# Patient Record
Sex: MALE
[De-identification: names, ages, dates, MRNs, and addresses within clinical notes are randomized per-mention and may not be internally consistent; named-entity substitution may affect disease eponyms.]

---

## 2024-05-22 ENCOUNTER — DASHBOARD ENCOUNTERS (OUTPATIENT)
Age: 19
End: 2024-05-22

## 2024-05-22 ENCOUNTER — OFFICE VISIT (OUTPATIENT)
Dept: URBAN - METROPOLITAN AREA CLINIC 128 | Facility: CLINIC | Age: 19
End: 2024-05-22
Payer: COMMERCIAL

## 2024-05-22 ENCOUNTER — TELEPHONE ENCOUNTER (OUTPATIENT)
Dept: URBAN - METROPOLITAN AREA CLINIC 128 | Facility: CLINIC | Age: 19
End: 2024-05-22

## 2024-05-22 VITALS
BODY MASS INDEX: 22.89 KG/M2 | SYSTOLIC BLOOD PRESSURE: 120 MMHG | HEIGHT: 72 IN | DIASTOLIC BLOOD PRESSURE: 72 MMHG | TEMPERATURE: 98.1 F | WEIGHT: 169 LBS

## 2024-05-22 DIAGNOSIS — R11.2 NAUSEA AND VOMITING IN ADULT: ICD-10-CM

## 2024-05-22 DIAGNOSIS — K21.9 CHRONIC GERD: ICD-10-CM

## 2024-05-22 DIAGNOSIS — R10.2 SUPRAPUBIC ABDOMINAL PAIN: ICD-10-CM

## 2024-05-22 DIAGNOSIS — R19.7 ACUTE DIARRHEA: ICD-10-CM

## 2024-05-22 PROBLEM — 235595009: Status: ACTIVE | Noted: 2024-05-22

## 2024-05-22 PROCEDURE — 99244 OFF/OP CNSLTJ NEW/EST MOD 40: CPT | Performed by: INTERNAL MEDICINE

## 2024-05-22 PROCEDURE — 99204 OFFICE O/P NEW MOD 45 MIN: CPT | Performed by: INTERNAL MEDICINE

## 2024-05-22 RX ORDER — SOD SULF/POT CHLORIDE/MAG SULF 1.479 G
12 TABLETS THE FIRST DOSE THE EVENING BEFORE AND SECOND DOSE THE MORNING OF COLONOSCOPY TABLET ORAL TWICE A DAY
Qty: 24
Start: 2024-05-22 | End: 2024-05-25

## 2024-05-22 RX ORDER — PANTOPRAZOLE SODIUM 40 MG/1
1 TABLET TABLET, DELAYED RELEASE ORAL ONCE A DAY
Qty: 90 TABLET | Refills: 3 | OUTPATIENT
Start: 2024-05-22

## 2024-05-22 RX ORDER — SOD SULF/POT CHLORIDE/MAG SULF 1.479 G
12 TABLETS THE FIRST DOSE THE EVENING BEFORE AND SECOND DOSE THE MORNING OF COLONOSCOPY TABLET ORAL TWICE A DAY
Qty: 24 | OUTPATIENT
Start: 2024-05-22 | End: 2024-05-23

## 2024-06-13 ENCOUNTER — CLAIMS CREATED FROM THE CLAIM WINDOW (OUTPATIENT)
Dept: URBAN - METROPOLITAN AREA CLINIC 4 | Facility: CLINIC | Age: 19
End: 2024-06-13
Payer: COMMERCIAL

## 2024-06-13 ENCOUNTER — OUT OF OFFICE VISIT (OUTPATIENT)
Dept: URBAN - METROPOLITAN AREA SURGERY CENTER 31 | Facility: SURGERY CENTER | Age: 19
End: 2024-06-13
Payer: COMMERCIAL

## 2024-06-13 DIAGNOSIS — D12.5 BENIGN NEOPLASM OF SIGMOID COLON: ICD-10-CM

## 2024-06-13 DIAGNOSIS — R19.7 ACUTE DIARRHEA: ICD-10-CM

## 2024-06-13 DIAGNOSIS — R19.5 OCCULT BLOOD + STOOL: ICD-10-CM

## 2024-06-13 DIAGNOSIS — K21.9 ACID REFLUX: ICD-10-CM

## 2024-06-13 DIAGNOSIS — Z12.11 COLON CANCER SCREENING (HIGH RISK): ICD-10-CM

## 2024-06-13 DIAGNOSIS — K31.89 OTHER DISEASES OF STOMACH AND DUODENUM: ICD-10-CM

## 2024-06-13 DIAGNOSIS — K63.89 OTHER SPECIFIED DISEASES OF INTESTINE: ICD-10-CM

## 2024-06-13 DIAGNOSIS — R10.84 ABDOMINAL CRAMPING, GENERALIZED: ICD-10-CM

## 2024-06-13 DIAGNOSIS — K21.9 GASTRO-ESOPHAGEAL REFLUX DISEASE WITHOUT ESOPHAGITIS: ICD-10-CM

## 2024-06-13 DIAGNOSIS — K63.5 BENIGN COLON POLYP: ICD-10-CM

## 2024-06-13 PROCEDURE — 45385 COLONOSCOPY W/LESION REMOVAL: CPT | Performed by: INTERNAL MEDICINE

## 2024-06-13 PROCEDURE — 88305 TISSUE EXAM BY PATHOLOGIST: CPT | Performed by: PATHOLOGY

## 2024-06-13 PROCEDURE — 43239 EGD BIOPSY SINGLE/MULTIPLE: CPT | Performed by: INTERNAL MEDICINE

## 2024-06-13 PROCEDURE — 88312 SPECIAL STAINS GROUP 1: CPT | Performed by: PATHOLOGY

## 2024-06-13 PROCEDURE — 45380 COLONOSCOPY AND BIOPSY: CPT | Performed by: INTERNAL MEDICINE

## 2024-06-13 PROCEDURE — 00813 ANES UPR LWR GI NDSC PX: CPT | Performed by: NURSE ANESTHETIST, CERTIFIED REGISTERED

## 2024-06-13 RX ORDER — PANTOPRAZOLE SODIUM 40 MG/1
1 TABLET TABLET, DELAYED RELEASE ORAL ONCE A DAY
Qty: 90 TABLET | Refills: 3 | Status: ACTIVE | COMMUNITY
Start: 2024-05-22

## 2024-06-27 ENCOUNTER — TELEPHONE ENCOUNTER (OUTPATIENT)
Dept: URBAN - METROPOLITAN AREA CLINIC 128 | Facility: CLINIC | Age: 19
End: 2024-06-27

## 2024-07-03 ENCOUNTER — OFFICE VISIT (OUTPATIENT)
Dept: URBAN - METROPOLITAN AREA CLINIC 80 | Facility: CLINIC | Age: 19
End: 2024-07-03
Payer: COMMERCIAL

## 2024-07-03 ENCOUNTER — LAB OUTSIDE AN ENCOUNTER (OUTPATIENT)
Dept: URBAN - METROPOLITAN AREA CLINIC 80 | Facility: CLINIC | Age: 19
End: 2024-07-03

## 2024-07-03 VITALS
HEIGHT: 72 IN | TEMPERATURE: 97.9 F | WEIGHT: 172 LBS | HEART RATE: 55 BPM | SYSTOLIC BLOOD PRESSURE: 134 MMHG | BODY MASS INDEX: 23.3 KG/M2 | DIASTOLIC BLOOD PRESSURE: 69 MMHG

## 2024-07-03 DIAGNOSIS — K52.89 (LYMPHOCYTIC) MICROSCOPIC COLITIS: ICD-10-CM

## 2024-07-03 DIAGNOSIS — K60.2 ANAL FISSURE: ICD-10-CM

## 2024-07-03 DIAGNOSIS — R11.2 NAUSEA AND VOMITING IN ADULT: ICD-10-CM

## 2024-07-03 DIAGNOSIS — K21.9 CHRONIC GERD: ICD-10-CM

## 2024-07-03 PROCEDURE — 99214 OFFICE O/P EST MOD 30 MIN: CPT

## 2024-07-03 RX ORDER — PANTOPRAZOLE SODIUM 40 MG/1
1 TABLET TABLET, DELAYED RELEASE ORAL ONCE A DAY
Qty: 90 TABLET | Refills: 3 | Status: ACTIVE | COMMUNITY
Start: 2024-05-22

## 2024-07-03 RX ORDER — RIFAXIMIN 550 MG/1
1 TABLET TABLET ORAL THREE TIMES A DAY
Qty: 42 TABLET | Refills: 0 | OUTPATIENT
Start: 2024-07-03 | End: 2024-07-17

## 2024-07-03 NOTE — HPI-TODAY'S VISIT:
5/22/2024: pt referred by Dr. Anai Field for issues related to diarrhea a copy of this note will be sent to referring physician.  pt notes onset of occasional diarrhea that started 2 years ago on and off occurs most weeks notes asssoc n/v several times per week takes monocycline for acne emesis is primarily food that he ate the night before but mostly is bilious liquid he improves with emesis assoc abd pain that is suprapubic  when he sees diarrhea, it is 1-2 bm/day that is bss of 7. with occ blood and mucus it tends to be post prandial often no clear trigger food however notes normal energy notes reduced appetite denies assoc weight loss assoc heartburn/reflux started on omeprazole  improved but ongoing no dysphagia tried lactose free and gluten free x 2 weeks without benefit currently at [facility] notes stable weight no rash   saw GI elsewhere  back in [place]  they did labs labs and stool test but it was unremarkble no procedures done  no fam hx of colon polyps or gastric cancer.  maternal grandmother with ?crohn's dad with colon cancer   7/3/2024:  Colonoscopy on 6/13/2024 with fair prep, one polyp, and nodular ileal mucosa.    EGD on 6/13/2024 with esophageal mucosal changes. Erythematous mucosa in the gastric body and antrum.    Pathology showing proton pump inhibitor effect, reflux type changes, and a hyperplastic polyp  Labs from 10/2023 visit with [facility] [place] reviewed showing fecal krista of 12, CRP <1, unremarkable CBC, immunoglobulin A 207, tTG <2, endomysial antibody IgA neg, normal TSH and T4, total bili 0.7, alk phos 107, AST 19, ALT 12.  Stool study results were not shown.--mother thinks results may have been lost   pt notes that he switched from omeprazole to protonix after last appointment. This medication has been helping with heartburn but not helping with the nausea and vomiting  Pt notes bowel habits unchanged since last appointment. Last episode of diarrhea was Saturday bss 7  These symptom wax and wane. He can have a normal day where he has only one BM and its well formed. He reports about 2 days that are normal in regard to bowel habits per week.  Pt is still having N/V in the most mornings.  Pt reports suprapubic discomfort in the morning has continued, this discomfort does not seen to be assocaited with food. Having a BM causing a "release" of this pain.  pt has seen blood almost everyday on the toilet paper. These episodes of bleeding are unchanged since colonoscopy   Spoke with pt via phone once he left the office, pt stated that he is having some mild diffuse abdominal tenderness after visit. I informed pt that I would put in an order for a CT, but if this pain presists or becomes severe, he needs to go to the ER.

## 2024-07-03 NOTE — PHYSICAL EXAM GASTROINTESTINAL
Abdomen , soft, mild RUQ tenderness, nondistended , no guarding or rigidity , no masses palpable , normal bowel sounds Liver and Spleen , no hepatosplenomegaly Rectal , normal sphincter tone, fissure present, no internal hemorrhoids, no external hemorrhoids, no rectal masses, no bleeding present. A chaperone was present during the rectal examination

## 2024-07-04 LAB
ALBUMIN: 4.9
ALKALINE PHOSPHATASE: 83
ALT (SGPT): 11
AST (SGOT): 17
BASO (ABSOLUTE): 0.1
BASOS: 1
BILIRUBIN, TOTAL: 0.5
BUN/CREATININE RATIO: 12
BUN: 12
C-REACTIVE PROTEIN, QUANT: <1
CALCIUM: 10.1
CARBON DIOXIDE, TOTAL: 24
CHLORIDE: 104
CREATININE: 0.98
EGFR: 114
EOS (ABSOLUTE): 0.4
EOS: 7
GLOBULIN, TOTAL: 2.5
GLUCOSE: 88
HEMATOCRIT: 45.6
HEMATOLOGY COMMENTS:: (no result)
HEMOGLOBIN: 15
IMMATURE CELLS: (no result)
IMMATURE GRANS (ABS): 0
IMMATURE GRANULOCYTES: 0
LYMPHS (ABSOLUTE): 2.9
LYMPHS: 48
MCH: 29.8
MCHC: 32.9
MCV: 91
MONOCYTES(ABSOLUTE): 0.6
MONOCYTES: 9
NEUTROPHILS (ABSOLUTE): 2.1
NEUTROPHILS: 35
NRBC: (no result)
PLATELETS: 269
POTASSIUM: 5.4
PROTEIN, TOTAL: 7.4
RBC: 5.03
RDW: 11.9
SODIUM: 140
WBC: 6.1

## 2024-07-05 ENCOUNTER — TELEPHONE ENCOUNTER (OUTPATIENT)
Dept: URBAN - METROPOLITAN AREA CLINIC 80 | Facility: CLINIC | Age: 19
End: 2024-07-05

## 2024-07-09 ENCOUNTER — TELEPHONE ENCOUNTER (OUTPATIENT)
Dept: URBAN - METROPOLITAN AREA CLINIC 80 | Facility: CLINIC | Age: 19
End: 2024-07-09

## 2024-07-10 ENCOUNTER — TELEPHONE ENCOUNTER (OUTPATIENT)
Dept: URBAN - METROPOLITAN AREA CLINIC 80 | Facility: CLINIC | Age: 19
End: 2024-07-10

## 2024-07-31 ENCOUNTER — LAB OUTSIDE AN ENCOUNTER (OUTPATIENT)
Dept: URBAN - METROPOLITAN AREA CLINIC 80 | Facility: CLINIC | Age: 19
End: 2024-07-31

## 2024-07-31 ENCOUNTER — OFFICE VISIT (OUTPATIENT)
Dept: URBAN - METROPOLITAN AREA CLINIC 79 | Facility: CLINIC | Age: 19
End: 2024-07-31

## 2024-07-31 ENCOUNTER — OFFICE VISIT (OUTPATIENT)
Dept: URBAN - METROPOLITAN AREA CLINIC 80 | Facility: CLINIC | Age: 19
End: 2024-07-31

## 2024-07-31 VITALS
BODY MASS INDEX: 23.08 KG/M2 | WEIGHT: 170.4 LBS | SYSTOLIC BLOOD PRESSURE: 135 MMHG | DIASTOLIC BLOOD PRESSURE: 69 MMHG | TEMPERATURE: 98.1 F | HEART RATE: 68 BPM | HEIGHT: 72 IN

## 2024-07-31 RX ORDER — PANTOPRAZOLE SODIUM 40 MG/1
1 TABLET TABLET, DELAYED RELEASE ORAL ONCE A DAY
Qty: 90 TABLET | Refills: 3 | Status: ACTIVE | COMMUNITY
Start: 2024-05-22

## 2024-07-31 NOTE — HPI-TODAY'S VISIT:
5/22/2024: pt referred by Dr. Anai Field for issues related to diarrhea a copy of this note will be sent to referring physician.  pt notes onset of occasional diarrhea that started 2 years ago on and off occurs most weeks notes asssoc n/v several times per week takes monocycline for acne emesis is primarily food that he ate the night before but mostly is bilious liquid he improves with emesis assoc abd pain that is suprapubic  when he sees diarrhea, it is 1-2 bm/day that is bss of 7. with occ blood and mucus it tends to be post prandial often no clear trigger food however notes normal energy notes reduced appetite denies assoc weight loss assoc heartburn/reflux started on omeprazole  improved but ongoing no dysphagia tried lactose free and gluten free x 2 weeks without benefit currently at [facility] notes stable weight no rash   saw GI elsewhere  back in [place]  they did labs labs and stool test but it was unremarkble no procedures done  no fam hx of colon polyps or gastric cancer.  maternal grandmother with ?crohn's dad with colon cancer   7/3/2024:  Colonoscopy on 6/13/2024 with fair prep, one polyp, and nodular ileal mucosa.    EGD on 6/13/2024 with esophageal mucosal changes. Erythematous mucosa in the gastric body and antrum.    Pathology showing proton pump inhibitor effect, reflux type changes, and a hyperplastic polyp  Labs from 10/2023 visit with [facility] [place] reviewed showing fecal krista of 12, CRP <1, unremarkable CBC, immunoglobulin A 207, tTG <2, endomysial antibody IgA neg, normal TSH and T4, total bili 0.7, alk phos 107, AST 19, ALT 12.  Stool study results were not shown.--mother thinks results may have been lost   pt notes that he switched from omeprazole to protonix after last appointment. This medication has been helping with heartburn but not helping with the nausea and vomiting  Pt notes bowel habits unchanged since last appointment. Last episode of diarrhea was Saturday bss 7  These symptom wax and wane. He can have a normal day where he has only one BM and its well formed. He reports about 2 days that are normal in regard to bowel habits per week.  Pt is still having N/V in the most mornings.  Pt reports suprapubic discomfort in the morning has continued, this discomfort does not seen to be assocaited with food. Having a BM causing a "release" of this pain.  pt has seen blood almost everyday on the toilet paper. These episodes of bleeding are unchanged since colonoscopy   Spoke with pt via phone once he left the office, pt stated that he is having some mild diffuse abdominal tenderness after visit. I informed pt that I would put in an order for a CT, but if this pain presists or becomes severe, he needs to go to the ER.  At the time of last visit, pt was given rx for Xifaxan for diarrhea, PPI was recommended x 2 months then taper off and swith to pepcid, RUQ u/s was ordered, CT was ordered, rx for NTG given to treat anal fissure  Labs showed normal CRP and CBC. CMP with potassium of 5.4, pt was to have this rechecked any thyroid labs drawn with PCP  Today: Patint did not use the NTG but has not had any further episodes of blood on toilet paper. Patient states that he took the entire 14 day course of Xifiaxan and has not had any additional diarrhea. pt notes he is having 2-3 well formed BM a day, no more episodes of diarrhea after eating.  Patient continues to have nausea and vomiting almost every morning  Saturday, patient had an episode of vomiting after which he had episode of feeling like something was stuck in his throat, this sensation has gotten better but it is still there. Patient notes this pain is in the center of his chest and it feels like a rock is in his chest. Patient denies chest pressure. Patient is a nonsmoker, denies SOB.  For the first two days after this episode pt noted that it hurt to eat food. He was having this discomfort even with liquids. Patinet denies any additional suprapubic pain.  Patient is still having RUQ pain at random times since last visit. Patient notes that this pain is achy and only lasts a few seconds when it happens. It doesn't occur very often. Nothing seems to make it better or worse when it happens. It is very minimal when it occurs.  heartburn is much better on PPI.  Patinet had thyroid labs and CMP repeated since last with PCP, pt does not knw results.

## 2024-08-02 ENCOUNTER — WEB ENCOUNTER (OUTPATIENT)
Dept: URBAN - METROPOLITAN AREA CLINIC 80 | Facility: CLINIC | Age: 19
End: 2024-08-02

## 2024-08-07 ENCOUNTER — OFFICE VISIT (OUTPATIENT)
Dept: URBAN - METROPOLITAN AREA CLINIC 80 | Facility: CLINIC | Age: 19
End: 2024-08-07

## 2024-08-08 ENCOUNTER — TELEPHONE ENCOUNTER (OUTPATIENT)
Dept: URBAN - METROPOLITAN AREA CLINIC 80 | Facility: CLINIC | Age: 19
End: 2024-08-08

## 2024-08-08 RX ORDER — ONDANSETRON 4 MG/1
1 TABLET ON THE TONGUE AND ALLOW TO DISSOLVE TABLET, ORALLY DISINTEGRATING ORAL
Qty: 30 TABLET | Refills: 0 | OUTPATIENT
Start: 2024-08-08

## 2024-08-13 ENCOUNTER — WEB ENCOUNTER (OUTPATIENT)
Dept: URBAN - METROPOLITAN AREA CLINIC 80 | Facility: CLINIC | Age: 19
End: 2024-08-13

## 2024-08-26 ENCOUNTER — TELEPHONE ENCOUNTER (OUTPATIENT)
Dept: URBAN - METROPOLITAN AREA CLINIC 80 | Facility: CLINIC | Age: 19
End: 2024-08-26

## 2024-12-30 ENCOUNTER — WEB ENCOUNTER (OUTPATIENT)
Dept: URBAN - METROPOLITAN AREA CLINIC 128 | Facility: CLINIC | Age: 19
End: 2024-12-30

## 2025-03-04 ENCOUNTER — OFFICE VISIT (OUTPATIENT)
Dept: URBAN - METROPOLITAN AREA CLINIC 128 | Facility: CLINIC | Age: 20
End: 2025-03-04
Payer: COMMERCIAL

## 2025-03-04 VITALS
TEMPERATURE: 97.9 F | DIASTOLIC BLOOD PRESSURE: 75 MMHG | SYSTOLIC BLOOD PRESSURE: 120 MMHG | WEIGHT: 165 LBS | OXYGEN SATURATION: 99 % | HEIGHT: 72 IN | HEART RATE: 63 BPM | BODY MASS INDEX: 22.35 KG/M2

## 2025-03-04 DIAGNOSIS — R19.7 ACUTE DIARRHEA: ICD-10-CM

## 2025-03-04 PROCEDURE — 99214 OFFICE O/P EST MOD 30 MIN: CPT | Performed by: PHYSICIAN ASSISTANT

## 2025-03-04 RX ORDER — PANTOPRAZOLE SODIUM 40 MG/1
1 TABLET TABLET, DELAYED RELEASE ORAL ONCE A DAY
Qty: 90 TABLET | Refills: 3 | Status: ACTIVE | COMMUNITY
Start: 2024-05-22

## 2025-03-04 RX ORDER — ONDANSETRON 4 MG/1
1 TABLET ON THE TONGUE AND ALLOW TO DISSOLVE TABLET, ORALLY DISINTEGRATING ORAL
Qty: 30 TABLET | Refills: 0 | Status: ACTIVE | COMMUNITY
Start: 2024-08-08

## 2025-03-04 RX ORDER — CHOLESTYRAMINE 4 G/9G
1 PACKET MIXED WITH WATER OR NON-CARBONATED DRINK POWDER, FOR SUSPENSION ORAL ONCE A DAY
Qty: 30 | OUTPATIENT
Start: 2025-03-04

## 2025-03-04 NOTE — HPI-TODAY'S VISIT:
Patient s/p cholecytectomy 12/12/2024. He did well for 2 weeks and then he started to have diarrhea after eating.  He had covid 2 weeks ago and now stools are still loose and every time he eats he has a BM. No abdominal pain, no nausea. bristol type 6-7, red blood on tissue sometimes, none in the last couple of weeks. colonoscopy 6/2024: Normal mucosa in the entire examined colon. Biopsied. - The examination was otherwise normal on direct and retroflexion views. - One 5 mm polyp in the sigmoid colon, removed with a cold snare. Resected and retrieved. - Nodular ileal mucosa. Biopsied. EGD 6/2024: Esophageal mucosal changes suspicious for eosinophilic esophagitis. - Z-line regular, 39 cm from the incisors. - Erythematous mucosa in the gastric body and antrum. Biopsied. - Normal duodenal bulb and second portion of the duodenum. Biopsied. - Biopsies were taken with a cold forceps for evaluation of eosinophilic esophagitis.

## 2025-03-07 ENCOUNTER — TELEPHONE ENCOUNTER (OUTPATIENT)
Dept: URBAN - METROPOLITAN AREA CLINIC 128 | Facility: CLINIC | Age: 20
End: 2025-03-07

## 2025-03-07 LAB
IMMUNOGLOBULIN A, QN, SERUM: 294
INTERPRETATION: (no result)
T-TRANSGLUTAMINASE (TTG) IGA: 1.3
TSH W/REFLEX TO FT4: 1.06

## 2025-03-17 ENCOUNTER — LAB OUTSIDE AN ENCOUNTER (OUTPATIENT)
Dept: URBAN - METROPOLITAN AREA CLINIC 128 | Facility: CLINIC | Age: 20
End: 2025-03-17

## 2025-03-19 ENCOUNTER — OFFICE VISIT (OUTPATIENT)
Dept: URBAN - METROPOLITAN AREA CLINIC 128 | Facility: CLINIC | Age: 20
End: 2025-03-19

## 2025-03-20 ENCOUNTER — TELEPHONE ENCOUNTER (OUTPATIENT)
Dept: URBAN - METROPOLITAN AREA CLINIC 80 | Facility: CLINIC | Age: 20
End: 2025-03-20

## 2025-03-20 LAB
ADENOVIRUS F 40/41: NOT DETECTED
C. DIFFICILE TOXIN A/B, STOOL - QDX: NEGATIVE
CALPROTECTIN, STOOL - QDX: (no result)
CAMPYLOBACTER: NOT DETECTED
CLOSTRIDIUM DIFFICILE: DETECTED
ENTAMOEBA HISTOLYTICA: NOT DETECTED
ENTEROAGGREGATIVE E.COLI: NOT DETECTED
ENTEROTOXIGENIC E.COLI: NOT DETECTED
ESCHERICHIA COLI O157: NOT DETECTED
GIARDIA LAMBLIA: NOT DETECTED
NOROVIRUS GI/GII: NOT DETECTED
OVA AND PARASITE - QDX: (no result)
PANCREATICELASTASE ELISA, STOOL: (no result)
ROTAVIRUS A: NOT DETECTED
SALMONELLA SPP.: NOT DETECTED
SHIGA-LIKE TOXIN PRODUCING E.COLI: NOT DETECTED
SHIGELLA SPP. / ENTEROINVASIVE E.COLI: NOT DETECTED
VIBRIO PARAHAEMOLYTICUS: NOT DETECTED
VIBRIO SPP.: NOT DETECTED
YERSINIA ENTEROCOLITICA: NOT DETECTED

## 2025-03-20 RX ORDER — VANCOMYCIN HYDROCHLORIDE 125 MG/1
1 CAPSULE CAPSULE ORAL
Qty: 56 CAPSULE | Refills: 0 | OUTPATIENT
Start: 2025-03-20 | End: 2025-04-03

## 2025-04-09 ENCOUNTER — OFFICE VISIT (OUTPATIENT)
Dept: URBAN - METROPOLITAN AREA CLINIC 128 | Facility: CLINIC | Age: 20
End: 2025-04-09

## 2025-04-10 ENCOUNTER — OFFICE VISIT (OUTPATIENT)
Dept: URBAN - METROPOLITAN AREA CLINIC 128 | Facility: CLINIC | Age: 20
End: 2025-04-10

## 2025-04-10 ENCOUNTER — OFFICE VISIT (OUTPATIENT)
Dept: URBAN - METROPOLITAN AREA CLINIC 128 | Facility: CLINIC | Age: 20
End: 2025-04-10
Payer: COMMERCIAL

## 2025-04-10 DIAGNOSIS — A04.72 C. DIFFICILE COLITIS: ICD-10-CM

## 2025-04-10 PROCEDURE — 99213 OFFICE O/P EST LOW 20 MIN: CPT | Performed by: PHYSICIAN ASSISTANT

## 2025-04-10 RX ORDER — CHOLESTYRAMINE 4 G/9G
1 PACKET MIXED WITH WATER OR NON-CARBONATED DRINK POWDER, FOR SUSPENSION ORAL ONCE A DAY
Qty: 30 | Status: ACTIVE | COMMUNITY
Start: 2025-03-04

## 2025-04-10 RX ORDER — ONDANSETRON 4 MG/1
1 TABLET ON THE TONGUE AND ALLOW TO DISSOLVE TABLET, ORALLY DISINTEGRATING ORAL
Qty: 30 TABLET | Refills: 0 | Status: ACTIVE | COMMUNITY
Start: 2024-08-08

## 2025-04-10 RX ORDER — PANTOPRAZOLE SODIUM 40 MG/1
1 TABLET TABLET, DELAYED RELEASE ORAL ONCE A DAY
Qty: 90 TABLET | Refills: 3 | Status: ACTIVE | COMMUNITY
Start: 2024-05-22

## 2025-04-10 NOTE — HPI-TODAY'S VISIT:
Patient tested positive for c. difficile and now is feeling well after finishing vancomycin  therapy. NO diarrhea no abdominal pain, no nausea. He is having 1-2 BMs a day that are solids, he is back to his normal.